# Patient Record
Sex: FEMALE | Race: WHITE | HISPANIC OR LATINO | URBAN - METROPOLITAN AREA
[De-identification: names, ages, dates, MRNs, and addresses within clinical notes are randomized per-mention and may not be internally consistent; named-entity substitution may affect disease eponyms.]

---

## 2021-10-12 ENCOUNTER — EMERGENCY (EMERGENCY)
Facility: HOSPITAL | Age: 56
LOS: 1 days | Discharge: HOME | End: 2021-10-12
Attending: EMERGENCY MEDICINE
Payer: MEDICAID

## 2021-10-12 VITALS
RESPIRATION RATE: 18 BRPM | OXYGEN SATURATION: 96 % | TEMPERATURE: 100 F | DIASTOLIC BLOOD PRESSURE: 69 MMHG | HEART RATE: 73 BPM | WEIGHT: 173.5 LBS | SYSTOLIC BLOOD PRESSURE: 130 MMHG | HEIGHT: 62 IN

## 2021-10-12 DIAGNOSIS — Z90.710 ACQUIRED ABSENCE OF BOTH CERVIX AND UTERUS: Chronic | ICD-10-CM

## 2021-10-12 LAB
APPEARANCE UR: CLEAR — SIGNIFICANT CHANGE UP
BACTERIA # UR AUTO: ABNORMAL
BILIRUB UR-MCNC: NEGATIVE — SIGNIFICANT CHANGE UP
COLOR SPEC: YELLOW — SIGNIFICANT CHANGE UP
DIFF PNL FLD: ABNORMAL
EPI CELLS # UR: SIGNIFICANT CHANGE UP
GLUCOSE UR QL: NEGATIVE MG/DL — SIGNIFICANT CHANGE UP
HYALINE CASTS # UR AUTO: ABNORMAL /LPF
KETONES UR-MCNC: NEGATIVE — SIGNIFICANT CHANGE UP
LEUKOCYTE ESTERASE UR-ACNC: ABNORMAL
NITRITE UR-MCNC: NEGATIVE — SIGNIFICANT CHANGE UP
PH UR: 6 — SIGNIFICANT CHANGE UP (ref 5–8)
PROT UR-MCNC: 30 MG/DL
RBC CASTS # UR COMP ASSIST: ABNORMAL /HPF (ref 0–4)
SP GR SPEC: 1.02 — SIGNIFICANT CHANGE UP (ref 1.01–1.02)
UROBILINOGEN FLD QL: NEGATIVE MG/DL — SIGNIFICANT CHANGE UP
WBC UR QL: ABNORMAL

## 2021-10-12 PROCEDURE — 99283 EMERGENCY DEPT VISIT LOW MDM: CPT

## 2021-10-12 RX ORDER — CEPHALEXIN 500 MG
1 CAPSULE ORAL
Qty: 14 | Refills: 0
Start: 2021-10-12 | End: 2021-10-18

## 2021-10-12 NOTE — ED PROVIDER NOTE - PATIENT PORTAL LINK FT
You can access the FollowMyHealth Patient Portal offered by [facility] by registering at the following website: [email or web address]. By joining [facility]’s FollowMyHealth portal, you will also be able to view your health information using other applications (apps) compatible with our system.

## 2021-10-12 NOTE — ED PROVIDER NOTE - PROGRESS NOTE DETAILS
patient seen by attending.  patient c/o urinary frequency and incontinence for past several days.  patient also c/o vaginal lesion to canal for past month, no pain. patient has appt with her GYN this week.    exam (performed with family, , and student in room)  reveals small nontender lesion to canal, possible prolapsed uterus??  will run UA r/o UTI, no cva tenderness, no fevers, no chills.   patient aware needs to see her GYN as planned due to lesion and work up patient seen by attending.  patient c/o urinary frequency and incontinence for past several days.  patient also c/o vaginal lesion to canal for past month, no pain. patient has appt with her GYN this week.    exam (performed with family, , and student in room)  reveals small nontender lesion to canal,  will run UA r/o UTI, no cva tenderness, no fevers, no chills.   patient aware needs to see her GYN as planned due to lesion and work up ABDULLAHI King: patient seen by attending.  patient c/o urinary frequency and incontinence for past several days.  patient also c/o vaginal lesion to canal for past month, no pain. patient has appt with her GYN this week.    exam (performed with family, , and student in room)  reveals small nontender lesion to canal,  will run UA r/o UTI, no cva tenderness, no fevers, no chills.   patient aware needs to see her GYN as planned due to lesion and work up

## 2021-10-12 NOTE — ED ADULT TRIAGE NOTE - CHIEF COMPLAINT QUOTE
Pt c/o vulva growth X 1 month and now having difficulty urinating.  Pt also c/o lower back pain X 2 weeks.

## 2021-10-12 NOTE — ED PROVIDER NOTE - CHIEF COMPLAINT
The patient is a 56y Female complaining of urinary retention. The patient is a 56y Female complaining of urinary symptoms.

## 2021-10-19 ENCOUNTER — APPOINTMENT (OUTPATIENT)
Dept: OBGYN | Facility: CLINIC | Age: 56
End: 2021-10-19
Payer: MEDICAID

## 2021-10-19 VITALS
BODY MASS INDEX: 30.73 KG/M2 | DIASTOLIC BLOOD PRESSURE: 78 MMHG | HEIGHT: 63 IN | SYSTOLIC BLOOD PRESSURE: 124 MMHG | WEIGHT: 173.44 LBS

## 2021-10-19 DIAGNOSIS — Z78.9 OTHER SPECIFIED HEALTH STATUS: ICD-10-CM

## 2021-10-19 DIAGNOSIS — N81.6 RECTOCELE: ICD-10-CM

## 2021-10-19 DIAGNOSIS — Z86.59 PERSONAL HISTORY OF OTHER MENTAL AND BEHAVIORAL DISORDERS: ICD-10-CM

## 2021-10-19 DIAGNOSIS — N89.8 OTHER SPECIFIED NONINFLAMMATORY DISORDERS OF VAGINA: ICD-10-CM

## 2021-10-19 DIAGNOSIS — Z00.00 ENCOUNTER FOR GENERAL ADULT MEDICAL EXAMINATION W/OUT ABNORMAL FINDINGS: ICD-10-CM

## 2021-10-19 DIAGNOSIS — Z85.42 PERSONAL HISTORY OF MALIGNANT NEOPLASM OF OTHER PARTS OF UTERUS: ICD-10-CM

## 2021-10-19 DIAGNOSIS — Z82.49 FAMILY HISTORY OF ISCHEMIC HEART DISEASE AND OTHER DISEASES OF THE CIRCULATORY SYSTEM: ICD-10-CM

## 2021-10-19 DIAGNOSIS — Z78.0 ASYMPTOMATIC MENOPAUSAL STATE: ICD-10-CM

## 2021-10-19 DIAGNOSIS — Z87.440 PERSONAL HISTORY OF URINARY (TRACT) INFECTIONS: ICD-10-CM

## 2021-10-19 DIAGNOSIS — Z80.0 FAMILY HISTORY OF MALIGNANT NEOPLASM OF DIGESTIVE ORGANS: ICD-10-CM

## 2021-10-19 DIAGNOSIS — Z01.419 ENCOUNTER FOR GYNECOLOGICAL EXAMINATION (GENERAL) (ROUTINE) W/OUT ABNORMAL FINDINGS: ICD-10-CM

## 2021-10-19 DIAGNOSIS — Z83.3 FAMILY HISTORY OF DIABETES MELLITUS: ICD-10-CM

## 2021-10-19 PROCEDURE — 99386 PREV VISIT NEW AGE 40-64: CPT

## 2021-10-19 PROCEDURE — 99202 OFFICE O/P NEW SF 15 MIN: CPT | Mod: 25

## 2021-10-19 NOTE — HISTORY OF PRESENT ILLNESS
[N] : Patient is not sexually active [Y] : Positive pregnancy history [Menarche Age: ____] : age at menarche was [unfilled] [Menopause Age: ____] : age at menopause was [unfilled] [Mammogramdate] : 01/18 [PapSmeardate] : 01/19 [ColonoscopyDate] : 01/17 [PGHxTotal] : 4 [[facility] [PGHxPremature] : 0 [PGHxAbortions] : 0 [[facility]] : 4 [PGHxABInduced] : 0 [PGHxABSpont] : 0 [PGHxEctopic] : 0 [PGHxMultBirths] : 0

## 2021-10-20 ENCOUNTER — APPOINTMENT (OUTPATIENT)
Dept: MAMMOGRAPHY | Facility: CLINIC | Age: 56
End: 2021-10-20

## 2021-10-20 ENCOUNTER — RESULT REVIEW (OUTPATIENT)
Age: 56
End: 2021-10-20

## 2021-10-20 ENCOUNTER — OUTPATIENT (OUTPATIENT)
Dept: OUTPATIENT SERVICES | Facility: HOSPITAL | Age: 56
LOS: 1 days | End: 2021-10-20
Payer: MEDICAID

## 2021-10-20 ENCOUNTER — APPOINTMENT (OUTPATIENT)
Dept: RADIOLOGY | Facility: CLINIC | Age: 56
End: 2021-10-20

## 2021-10-20 DIAGNOSIS — Z90.710 ACQUIRED ABSENCE OF BOTH CERVIX AND UTERUS: Chronic | ICD-10-CM

## 2021-10-20 DIAGNOSIS — Z00.00 ENCOUNTER FOR GENERAL ADULT MEDICAL EXAMINATION WITHOUT ABNORMAL FINDINGS: ICD-10-CM

## 2021-10-20 LAB — HPV HIGH+LOW RISK DNA PNL CVX: NOT DETECTED

## 2021-10-20 PROCEDURE — 77085 DXA BONE DENSITY AXL VRT FX: CPT

## 2021-10-20 PROCEDURE — 77067 SCR MAMMO BI INCL CAD: CPT

## 2021-10-20 PROCEDURE — 77063 BREAST TOMOSYNTHESIS BI: CPT

## 2021-10-25 LAB — CYTOLOGY CVX/VAG DOC THIN PREP: NORMAL

## 2021-10-26 ENCOUNTER — APPOINTMENT (OUTPATIENT)
Dept: OBGYN | Facility: CLINIC | Age: 56
End: 2021-10-26

## 2022-11-21 ENCOUNTER — OFFICE (OUTPATIENT)
Dept: URBAN - METROPOLITAN AREA CLINIC 115 | Facility: CLINIC | Age: 57
Setting detail: OPHTHALMOLOGY
End: 2022-11-21
Payer: MEDICAID

## 2022-11-21 DIAGNOSIS — H40.013: ICD-10-CM

## 2022-11-21 DIAGNOSIS — H04.123: ICD-10-CM

## 2022-11-21 DIAGNOSIS — H25.13: ICD-10-CM

## 2022-11-21 PROCEDURE — 92012 INTRM OPH EXAM EST PATIENT: CPT | Performed by: OPHTHALMOLOGY

## 2022-11-21 PROCEDURE — 92083 EXTENDED VISUAL FIELD XM: CPT | Performed by: OPHTHALMOLOGY

## 2022-11-21 ASSESSMENT — REFRACTION_CURRENTRX
OS_ADD: +2.00
OS_SPHERE: +2.00
OD_SPHERE: +1.75
OS_ADD: +2.00
OD_OVR_VA: 20/
OS_OVR_VA: 20/
OD_VPRISM_DIRECTION: PROGS
OD_CYLINDER: -0.50
OD_ADD: +2.00
OS_CYLINDER: SPH
OS_VPRISM_DIRECTION: PROGS
OS_SPHERE: +2.25
OD_CYLINDER: -0.50
OD_AXIS: 090
OD_SPHERE: +1.75
OS_CYLINDER: 0.00
OS_AXIS: 180
OD_VPRISM_DIRECTION: PROGS
OD_OVR_VA: 20/
OS_VPRISM_DIRECTION: PROGS
OS_OVR_VA: 20/
OD_AXIS: 086
OD_ADD: +2.00

## 2022-11-21 ASSESSMENT — PACHYMETRY
OD_CT_CORRECTION: 1
OS_CT_CORRECTION: 1
OS_CT_UM: 534
OD_CT_UM: 522

## 2022-11-21 ASSESSMENT — REFRACTION_MANIFEST
OD_VA1: 20/20
OD_AXIS: 070
OS_VA2: 20/20
OS_VA1: 20/20
OS_SPHERE: +1.00
OD_VA2: 20/20
OD_CYLINDER: -0.50
OS_ADD: +2.25
OS_CYLINDER: -0.25
OD_ADD: +2.25
OD_SPHERE: +1.00
OS_AXIS: 110

## 2022-11-21 ASSESSMENT — AXIALLENGTH_DERIVED
OD_AL: 22.5658
OS_AL: 22.5214
OS_AL: 22.1289
OD_AL: 22.1718

## 2022-11-21 ASSESSMENT — KERATOMETRY
OS_K1POWER_DIOPTERS: 45.25
OS_AXISANGLE_DEGREES: 073
METHOD_AUTO_MANUAL: AUTO
OS_K2POWER_DIOPTERS: 46.00
OD_K2POWER_DIOPTERS: 46.00
OD_K1POWER_DIOPTERS: 45.25
OD_AXISANGLE_DEGREES: 096

## 2022-11-21 ASSESSMENT — SPHEQUIV_DERIVED
OS_SPHEQUIV: 2
OD_SPHEQUIV: 0.75
OS_SPHEQUIV: 0.875
OD_SPHEQUIV: 1.875

## 2022-11-21 ASSESSMENT — REFRACTION_AUTOREFRACTION
OS_SPHERE: +2.25
OS_AXIS: 091
OD_CYLINDER: -0.75
OD_AXIS: 074
OS_CYLINDER: -0.50
OD_SPHERE: +2.25

## 2022-11-21 ASSESSMENT — VISUAL ACUITY
OD_BCVA: 20/25
OS_BCVA: 20/25

## 2022-11-21 ASSESSMENT — DECREASING TEAR LAKE - SEVERITY SCORE
OS_DEC_TEARLAKE: 1+
OD_DEC_TEARLAKE: 1+

## 2022-11-21 ASSESSMENT — TONOMETRY
OD_IOP_MMHG: 19
OS_IOP_MMHG: 18

## 2022-12-14 ENCOUNTER — OFFICE (OUTPATIENT)
Dept: URBAN - METROPOLITAN AREA CLINIC 115 | Facility: CLINIC | Age: 57
Setting detail: OPHTHALMOLOGY
End: 2022-12-14
Payer: MEDICAID

## 2022-12-14 DIAGNOSIS — H25.11: ICD-10-CM

## 2022-12-14 DIAGNOSIS — H40.013: ICD-10-CM

## 2022-12-14 DIAGNOSIS — H25.13: ICD-10-CM

## 2022-12-14 DIAGNOSIS — H25.12: ICD-10-CM

## 2022-12-14 PROCEDURE — 92250 FUNDUS PHOTOGRAPHY W/I&R: CPT | Performed by: OPHTHALMOLOGY

## 2022-12-14 PROCEDURE — 99214 OFFICE O/P EST MOD 30 MIN: CPT | Performed by: OPHTHALMOLOGY

## 2022-12-14 PROCEDURE — 92136 OPHTHALMIC BIOMETRY: CPT | Performed by: OPHTHALMOLOGY

## 2022-12-14 PROCEDURE — 92083 EXTENDED VISUAL FIELD XM: CPT | Performed by: OPHTHALMOLOGY

## 2022-12-14 PROCEDURE — 92020 GONIOSCOPY: CPT | Performed by: OPHTHALMOLOGY

## 2022-12-14 ASSESSMENT — KERATOMETRY
OS_K1POWER_DIOPTERS: 45.25
OS_K2POWER_DIOPTERS: 46.00
METHOD_AUTO_MANUAL: AUTO
OD_K1POWER_DIOPTERS: 45.25
OD_AXISANGLE_DEGREES: 096
OD_K2POWER_DIOPTERS: 46.00
OS_AXISANGLE_DEGREES: 073

## 2022-12-14 ASSESSMENT — REFRACTION_AUTOREFRACTION
OD_AXIS: 069
OS_SPHERE: +2.25
OS_CYLINDER: -0.25
OD_CYLINDER: -0.50
OD_SPHERE: +2.25
OS_AXIS: 079

## 2022-12-14 ASSESSMENT — REFRACTION_MANIFEST
OS_CYLINDER: -0.25
OS_ADD: +2.25
OD_VA1: 20/40
OD_AXIS: 070
OD_SPHERE: +1.00
OD_VA2: 20/20
OD_CYLINDER: -0.50
OD_ADD: +2.25
OS_VA1: 20/30
OS_VA2: 20/20
OS_SPHERE: +1.00
OS_AXIS: 110

## 2022-12-14 ASSESSMENT — REFRACTION_CURRENTRX
OD_OVR_VA: 20/
OS_OVR_VA: 20/
OD_VPRISM_DIRECTION: PROGS
OD_SPHERE: +1.75
OS_VPRISM_DIRECTION: PROGS
OS_CYLINDER: SPH
OD_ADD: +2.00
OS_ADD: +2.00
OD_VPRISM_DIRECTION: PROGS
OS_VPRISM_DIRECTION: PROGS
OS_ADD: +2.00
OD_CYLINDER: -0.50
OD_AXIS: 086
OD_SPHERE: +1.75
OS_CYLINDER: 0.00
OS_SPHERE: +2.25
OD_OVR_VA: 20/
OS_OVR_VA: 20/
OD_AXIS: 090
OS_AXIS: 180
OD_CYLINDER: -0.50
OD_ADD: +2.00
OS_SPHERE: +2.00

## 2022-12-14 ASSESSMENT — CONFRONTATIONAL VISUAL FIELD TEST (CVF)
OD_FINDINGS: FULL
OS_FINDINGS: FULL

## 2022-12-14 ASSESSMENT — PACHYMETRY
OD_CT_UM: 522
OS_CT_CORRECTION: 1
OS_CT_UM: 534
OD_CT_CORRECTION: 1

## 2022-12-14 ASSESSMENT — TONOMETRY
OS_IOP_MMHG: 16
OD_IOP_MMHG: 16

## 2022-12-14 ASSESSMENT — SPHEQUIV_DERIVED
OS_SPHEQUIV: 2.125
OS_SPHEQUIV: 0.875
OD_SPHEQUIV: 0.75
OD_SPHEQUIV: 2

## 2022-12-14 ASSESSMENT — VISUAL ACUITY
OS_BCVA: 20/40
OD_BCVA: 20/30

## 2022-12-14 ASSESSMENT — AXIALLENGTH_DERIVED
OD_AL: 22.1289
OD_AL: 22.5658
OS_AL: 22.5214
OS_AL: 22.0861

## 2022-12-14 ASSESSMENT — DECREASING TEAR LAKE - SEVERITY SCORE
OS_DEC_TEARLAKE: 1+
OD_DEC_TEARLAKE: 1+

## 2022-12-14 NOTE — ED PROVIDER NOTE - DATE/TIME 1
----- Message from Alex Freeman MD sent at 12/14/2022  9:09 AM CST -----  This testosterone level is low. We may need to adjust medication.  Communicated via patient portal.  Please contact me with any questions.    (PSR: Please alert patient there is a portal result.)  
[Adequate] : adequate
12-Oct-2021 08:51

## 2023-01-13 ENCOUNTER — OFFICE (OUTPATIENT)
Dept: URBAN - METROPOLITAN AREA CLINIC 94 | Facility: CLINIC | Age: 58
Setting detail: OPHTHALMOLOGY
End: 2023-01-13
Payer: MEDICAID

## 2023-01-13 DIAGNOSIS — H40.013: ICD-10-CM

## 2023-01-13 DIAGNOSIS — H04.123: ICD-10-CM

## 2023-01-13 DIAGNOSIS — H25.13: ICD-10-CM

## 2023-01-13 PROCEDURE — 92012 INTRM OPH EXAM EST PATIENT: CPT | Performed by: OPHTHALMOLOGY

## 2023-01-13 ASSESSMENT — AXIALLENGTH_DERIVED
OS_AL: 22.5214
OS_AL: 22.0861
OD_AL: 22.4821
OD_AL: 22.0059

## 2023-01-13 ASSESSMENT — REFRACTION_CURRENTRX
OS_ADD: +2.00
OD_ADD: +2.00
OD_OVR_VA: 20/
OD_AXIS: 090
OD_OVR_VA: 20/
OS_SPHERE: +2.25
OD_AXIS: 086
OS_SPHERE: +2.00
OS_CYLINDER: SPH
OD_SPHERE: +1.75
OS_OVR_VA: 20/
OS_AXIS: 180
OD_ADD: +2.00
OD_VPRISM_DIRECTION: PROGS
OD_CYLINDER: -0.50
OD_CYLINDER: -0.50
OS_VPRISM_DIRECTION: PROGS
OS_CYLINDER: 0.00
OD_SPHERE: +1.75
OS_VPRISM_DIRECTION: PROGS
OS_OVR_VA: 20/
OD_VPRISM_DIRECTION: PROGS
OS_ADD: +2.00

## 2023-01-13 ASSESSMENT — KERATOMETRY
OD_K1POWER_DIOPTERS: 45.50
OS_K2POWER_DIOPTERS: 46.00
OS_AXISANGLE_DEGREES: 078
OD_AXISANGLE_DEGREES: 092
METHOD_AUTO_MANUAL: AUTO
OS_K1POWER_DIOPTERS: 45.25
OD_K2POWER_DIOPTERS: 46.25

## 2023-01-13 ASSESSMENT — SPHEQUIV_DERIVED
OS_SPHEQUIV: 0.875
OD_SPHEQUIV: 0.75
OS_SPHEQUIV: 2.125
OD_SPHEQUIV: 2.125

## 2023-01-13 ASSESSMENT — CONFRONTATIONAL VISUAL FIELD TEST (CVF)
OS_FINDINGS: FULL
OD_FINDINGS: FULL

## 2023-01-13 ASSESSMENT — VISUAL ACUITY
OD_BCVA: 20/40
OS_BCVA: 20/30

## 2023-01-13 ASSESSMENT — REFRACTION_MANIFEST
OD_CYLINDER: -0.50
OS_VA2: 20/20
OS_ADD: +2.25
OS_VA1: 20/30
OS_AXIS: 110
OS_SPHERE: +1.00
OS_CYLINDER: -0.25
OD_VA1: 20/40
OD_AXIS: 070
OD_VA2: 20/20
OD_SPHERE: +1.00
OD_ADD: +2.25

## 2023-01-13 ASSESSMENT — PACHYMETRY
OS_CT_UM: 534
OD_CT_CORRECTION: 1
OD_CT_UM: 522
OS_CT_CORRECTION: 1

## 2023-01-13 ASSESSMENT — REFRACTION_AUTOREFRACTION
OD_SPHERE: +2.50
OD_AXIS: 064
OS_AXIS: 073
OS_SPHERE: +2.25
OS_CYLINDER: -0.25
OD_CYLINDER: -0.75

## 2023-01-13 ASSESSMENT — TONOMETRY
OS_IOP_MMHG: 14
OD_IOP_MMHG: 14

## 2023-01-13 ASSESSMENT — DECREASING TEAR LAKE - SEVERITY SCORE
OD_DEC_TEARLAKE: 1+
OS_DEC_TEARLAKE: 1+

## 2023-01-16 ENCOUNTER — OFFICE (OUTPATIENT)
Dept: URBAN - METROPOLITAN AREA CLINIC 94 | Facility: CLINIC | Age: 58
Setting detail: OPHTHALMOLOGY
End: 2023-01-16
Payer: MEDICAID

## 2023-01-16 DIAGNOSIS — Z01.812: ICD-10-CM

## 2023-01-16 DIAGNOSIS — Z20.822: ICD-10-CM

## 2023-01-16 PROCEDURE — 99211 OFF/OP EST MAY X REQ PHY/QHP: CPT | Performed by: OPHTHALMOLOGY

## 2023-01-16 ASSESSMENT — SPHEQUIV_DERIVED
OD_SPHEQUIV: 0.75
OS_SPHEQUIV: 0.875
OS_SPHEQUIV: 2.125
OD_SPHEQUIV: 2.125

## 2023-01-16 ASSESSMENT — REFRACTION_CURRENTRX
OS_ADD: +2.00
OS_VPRISM_DIRECTION: PROGS
OD_SPHERE: +1.75
OS_ADD: +2.00
OD_AXIS: 090
OS_AXIS: 180
OD_CYLINDER: -0.50
OD_AXIS: 086
OS_CYLINDER: 0.00
OD_CYLINDER: -0.50
OS_OVR_VA: 20/
OD_VPRISM_DIRECTION: PROGS
OS_SPHERE: +2.25
OD_ADD: +2.00
OD_ADD: +2.00
OD_OVR_VA: 20/
OD_OVR_VA: 20/
OS_OVR_VA: 20/
OS_SPHERE: +2.00
OS_CYLINDER: SPH
OD_SPHERE: +1.75
OD_VPRISM_DIRECTION: PROGS
OS_VPRISM_DIRECTION: PROGS

## 2023-01-16 ASSESSMENT — REFRACTION_MANIFEST
OD_VA2: 20/20
OD_SPHERE: +1.00
OS_SPHERE: +1.00
OD_AXIS: 070
OD_VA1: 20/40
OS_VA1: 20/30
OD_ADD: +2.25
OS_CYLINDER: -0.25
OD_CYLINDER: -0.50
OS_VA2: 20/20
OS_ADD: +2.25
OS_AXIS: 110

## 2023-01-16 ASSESSMENT — KERATOMETRY
OS_K1POWER_DIOPTERS: 45.25
OS_K2POWER_DIOPTERS: 46.00
OD_AXISANGLE_DEGREES: 092
OD_K2POWER_DIOPTERS: 46.25
OD_K1POWER_DIOPTERS: 45.50
METHOD_AUTO_MANUAL: AUTO
OS_AXISANGLE_DEGREES: 078

## 2023-01-16 ASSESSMENT — AXIALLENGTH_DERIVED
OS_AL: 22.0861
OD_AL: 22.0059
OS_AL: 22.5214
OD_AL: 22.4821

## 2023-01-16 ASSESSMENT — VISUAL ACUITY
OD_BCVA: 20/40
OS_BCVA: 20/30

## 2023-01-16 ASSESSMENT — DECREASING TEAR LAKE - SEVERITY SCORE
OD_DEC_TEARLAKE: 1+
OS_DEC_TEARLAKE: 1+

## 2023-01-16 ASSESSMENT — REFRACTION_AUTOREFRACTION
OS_AXIS: 073
OS_CYLINDER: -0.25
OD_CYLINDER: -0.75
OD_AXIS: 064
OD_SPHERE: +2.50
OS_SPHERE: +2.25

## 2023-01-21 ENCOUNTER — OFFICE (OUTPATIENT)
Dept: URBAN - METROPOLITAN AREA CLINIC 94 | Facility: CLINIC | Age: 58
Setting detail: OPHTHALMOLOGY
End: 2023-01-21
Payer: MEDICAID

## 2023-01-21 DIAGNOSIS — Z01.812: ICD-10-CM

## 2023-01-21 DIAGNOSIS — Z20.822: ICD-10-CM

## 2023-01-21 PROCEDURE — 99211 OFF/OP EST MAY X REQ PHY/QHP: CPT | Performed by: OPHTHALMOLOGY

## 2023-01-23 ASSESSMENT — VISUAL ACUITY
OD_BCVA: 20/40
OS_BCVA: 20/30

## 2023-01-23 ASSESSMENT — REFRACTION_CURRENTRX
OD_ADD: +2.00
OS_ADD: +2.00
OS_ADD: +2.00
OS_SPHERE: +2.25
OD_SPHERE: +1.75
OD_CYLINDER: -0.50
OD_AXIS: 090
OD_CYLINDER: -0.50
OD_SPHERE: +1.75
OS_VPRISM_DIRECTION: PROGS
OS_SPHERE: +2.00
OS_CYLINDER: SPH
OS_OVR_VA: 20/
OS_CYLINDER: 0.00
OD_OVR_VA: 20/
OD_OVR_VA: 20/
OS_VPRISM_DIRECTION: PROGS
OS_AXIS: 180
OD_AXIS: 086
OD_VPRISM_DIRECTION: PROGS
OD_VPRISM_DIRECTION: PROGS
OS_OVR_VA: 20/
OD_ADD: +2.00

## 2023-01-23 ASSESSMENT — REFRACTION_MANIFEST
OD_VA2: 20/20
OS_ADD: +2.25
OD_ADD: +2.25
OS_VA2: 20/20
OD_CYLINDER: -0.50
OS_AXIS: 110
OD_VA1: 20/40
OS_SPHERE: +1.00
OS_VA1: 20/30
OD_SPHERE: +1.00
OS_CYLINDER: -0.25
OD_AXIS: 070

## 2023-01-23 ASSESSMENT — SPHEQUIV_DERIVED
OD_SPHEQUIV: 0.75
OS_SPHEQUIV: 2.125
OS_SPHEQUIV: 0.875
OD_SPHEQUIV: 2.125

## 2023-01-23 ASSESSMENT — REFRACTION_AUTOREFRACTION
OD_SPHERE: +2.50
OD_CYLINDER: -0.75
OS_CYLINDER: -0.25
OS_AXIS: 073
OD_AXIS: 064
OS_SPHERE: +2.25

## 2023-01-23 ASSESSMENT — KERATOMETRY
OS_K2POWER_DIOPTERS: 46.00
OD_AXISANGLE_DEGREES: 092
METHOD_AUTO_MANUAL: AUTO
OD_K2POWER_DIOPTERS: 46.25
OD_K1POWER_DIOPTERS: 45.50
OS_AXISANGLE_DEGREES: 078
OS_K1POWER_DIOPTERS: 45.25

## 2023-01-23 ASSESSMENT — AXIALLENGTH_DERIVED
OS_AL: 22.5214
OD_AL: 22.0059
OD_AL: 22.4821
OS_AL: 22.0861

## 2023-01-23 ASSESSMENT — DECREASING TEAR LAKE - SEVERITY SCORE
OD_DEC_TEARLAKE: 1+
OS_DEC_TEARLAKE: 1+

## 2023-01-24 ENCOUNTER — ASC (OUTPATIENT)
Dept: URBAN - METROPOLITAN AREA SURGERY 8 | Facility: SURGERY | Age: 58
Setting detail: OPHTHALMOLOGY
End: 2023-01-24
Payer: MEDICAID

## 2023-01-24 DIAGNOSIS — H52.211: ICD-10-CM

## 2023-01-24 DIAGNOSIS — H25.11: ICD-10-CM

## 2023-01-24 PROCEDURE — 66984 XCAPSL CTRC RMVL W/O ECP: CPT | Performed by: OPHTHALMOLOGY

## 2023-01-24 PROCEDURE — FEMTO CATARACT LASER: Performed by: OPHTHALMOLOGY

## 2023-01-25 ENCOUNTER — RX ONLY (RX ONLY)
Age: 58
End: 2023-01-25

## 2023-01-25 ENCOUNTER — OFFICE (OUTPATIENT)
Dept: URBAN - METROPOLITAN AREA CLINIC 94 | Facility: CLINIC | Age: 58
Setting detail: OPHTHALMOLOGY
End: 2023-01-25

## 2023-01-25 DIAGNOSIS — Z96.1: ICD-10-CM

## 2023-01-25 PROCEDURE — 99024 POSTOP FOLLOW-UP VISIT: CPT | Performed by: OPHTHALMOLOGY

## 2023-01-25 ASSESSMENT — REFRACTION_CURRENTRX
OS_CYLINDER: 0.00
OD_VPRISM_DIRECTION: PROGS
OD_SPHERE: +1.75
OS_VPRISM_DIRECTION: PROGS
OS_AXIS: 180
OD_OVR_VA: 20/
OD_ADD: +2.00
OD_OVR_VA: 20/
OD_CYLINDER: -0.50
OS_OVR_VA: 20/
OS_SPHERE: +2.00
OD_AXIS: 086
OS_VPRISM_DIRECTION: PROGS
OS_ADD: +2.00
OS_ADD: +2.00
OD_CYLINDER: -0.50
OS_CYLINDER: SPH
OS_SPHERE: +2.25
OD_SPHERE: +1.75
OD_VPRISM_DIRECTION: PROGS
OS_OVR_VA: 20/
OD_ADD: +2.00
OD_AXIS: 090

## 2023-01-25 ASSESSMENT — REFRACTION_AUTOREFRACTION
OD_AXIS: 019
OD_CYLINDER: -0.75
OS_CYLINDER: -0.25
OD_SPHERE: +0.75
OS_SPHERE: +2.25
OS_AXIS: 077

## 2023-01-25 ASSESSMENT — DECREASING TEAR LAKE - SEVERITY SCORE
OD_DEC_TEARLAKE: 1+
OS_DEC_TEARLAKE: 1+

## 2023-01-25 ASSESSMENT — KERATOMETRY
OS_K2POWER_DIOPTERS: 46.00
METHOD_AUTO_MANUAL: AUTO
OD_K2POWER_DIOPTERS: 46.50
OD_AXISANGLE_DEGREES: 100
OD_K1POWER_DIOPTERS: 45.25
OS_K1POWER_DIOPTERS: 45.25
OS_AXISANGLE_DEGREES: 082

## 2023-01-25 ASSESSMENT — SPHEQUIV_DERIVED
OS_SPHEQUIV: 2.125
OD_SPHEQUIV: 0.75
OS_SPHEQUIV: 0.875
OD_SPHEQUIV: 0.375

## 2023-01-25 ASSESSMENT — AXIALLENGTH_DERIVED
OD_AL: 22.6156
OS_AL: 22.0861
OD_AL: 22.4821
OS_AL: 22.5214

## 2023-01-25 ASSESSMENT — REFRACTION_MANIFEST
OS_VA2: 20/20
OD_VA1: 20/40
OD_CYLINDER: -0.50
OD_AXIS: 070
OS_SPHERE: +1.00
OS_CYLINDER: -0.25
OD_VA2: 20/20
OD_SPHERE: +1.00
OD_ADD: +2.25
OS_AXIS: 110
OS_VA1: 20/30
OS_ADD: +2.25

## 2023-01-25 ASSESSMENT — VISUAL ACUITY
OS_BCVA: 20/60
OD_BCVA: 20/40

## 2023-01-25 ASSESSMENT — PACHYMETRY
OD_CT_CORRECTION: 1
OS_CT_CORRECTION: 1
OD_CT_UM: 522
OS_CT_UM: 534

## 2023-01-25 ASSESSMENT — TONOMETRY
OD_IOP_MMHG: 15
OS_IOP_MMHG: 16

## 2023-01-25 ASSESSMENT — CONFRONTATIONAL VISUAL FIELD TEST (CVF)
OD_FINDINGS: FULL
OS_FINDINGS: FULL

## 2023-02-01 ENCOUNTER — OFFICE (OUTPATIENT)
Dept: URBAN - METROPOLITAN AREA CLINIC 94 | Facility: CLINIC | Age: 58
Setting detail: OPHTHALMOLOGY
End: 2023-02-01
Payer: MEDICAID

## 2023-02-01 DIAGNOSIS — H25.12: ICD-10-CM

## 2023-02-01 DIAGNOSIS — Z96.1: ICD-10-CM

## 2023-02-01 PROCEDURE — 92136 OPHTHALMIC BIOMETRY: CPT | Performed by: PHYSICIAN ASSISTANT

## 2023-02-01 PROCEDURE — 99024 POSTOP FOLLOW-UP VISIT: CPT | Performed by: PHYSICIAN ASSISTANT

## 2023-02-01 ASSESSMENT — REFRACTION_CURRENTRX
OD_CYLINDER: -0.50
OS_ADD: +2.00
OS_SPHERE: +2.00
OS_VPRISM_DIRECTION: PROGS
OD_ADD: +2.00
OD_SPHERE: +1.75
OD_CYLINDER: -0.50
OS_AXIS: 180
OS_SPHERE: +2.25
OS_OVR_VA: 20/
OS_VPRISM_DIRECTION: PROGS
OS_OVR_VA: 20/
OD_AXIS: 086
OD_ADD: +2.00
OD_VPRISM_DIRECTION: PROGS
OD_OVR_VA: 20/
OD_OVR_VA: 20/
OD_SPHERE: +1.75
OD_VPRISM_DIRECTION: PROGS
OS_CYLINDER: SPH
OS_ADD: +2.00
OS_CYLINDER: 0.00
OD_AXIS: 090

## 2023-02-01 ASSESSMENT — PACHYMETRY
OS_CT_UM: 534
OD_CT_UM: 522
OD_CT_CORRECTION: 1
OS_CT_CORRECTION: 1

## 2023-02-01 ASSESSMENT — REFRACTION_MANIFEST
OD_VA2: 20/20
OS_VA2: 20/20
OD_CYLINDER: -0.50
OD_AXIS: 070
OD_VA1: 20/40
OD_ADD: +2.25
OS_AXIS: 110
OS_ADD: +2.25
OS_SPHERE: +1.00
OS_VA1: 20/30
OD_SPHERE: +1.00
OS_CYLINDER: -0.25

## 2023-02-01 ASSESSMENT — REFRACTION_AUTOREFRACTION
OD_CYLINDER: -0.25
OD_SPHERE: +0.25
OD_AXIS: 070
OS_SPHERE: +2.50
OS_CYLINDER: -0.25
OS_AXIS: 085

## 2023-02-01 ASSESSMENT — VISUAL ACUITY
OD_BCVA: 20/25
OS_BCVA: 20/25

## 2023-02-01 ASSESSMENT — SUPERFICIAL PUNCTATE KERATITIS (SPK)
OD_SPK: 1+
OS_SPK: 1+

## 2023-02-01 ASSESSMENT — TONOMETRY
OD_IOP_MMHG: 17
OS_IOP_MMHG: 15

## 2023-02-01 ASSESSMENT — KERATOMETRY
METHOD_AUTO_MANUAL: AUTO
OD_K1POWER_DIOPTERS: 45.75
OD_K2POWER_DIOPTERS: 46.00
OS_K2POWER_DIOPTERS: 46.00
OS_K1POWER_DIOPTERS: 45.25
OD_AXISANGLE_DEGREES: 107
OS_AXISANGLE_DEGREES: 081

## 2023-02-01 ASSESSMENT — SPHEQUIV_DERIVED
OS_SPHEQUIV: 2.375
OD_SPHEQUIV: 0.125
OD_SPHEQUIV: 0.75
OS_SPHEQUIV: 0.875

## 2023-02-01 ASSESSMENT — AXIALLENGTH_DERIVED
OD_AL: 22.7055
OD_AL: 22.4821
OS_AL: 22.5214
OS_AL: 22.0011

## 2023-02-01 ASSESSMENT — DECREASING TEAR LAKE - SEVERITY SCORE
OD_DEC_TEARLAKE: 1+
OS_DEC_TEARLAKE: 1+

## 2023-02-01 ASSESSMENT — CONFRONTATIONAL VISUAL FIELD TEST (CVF)
OD_FINDINGS: FULL
OS_FINDINGS: FULL

## 2023-02-06 ENCOUNTER — OFFICE (OUTPATIENT)
Dept: URBAN - METROPOLITAN AREA CLINIC 94 | Facility: CLINIC | Age: 58
Setting detail: OPHTHALMOLOGY
End: 2023-02-06
Payer: MEDICAID

## 2023-02-06 DIAGNOSIS — Z20.822: ICD-10-CM

## 2023-02-06 DIAGNOSIS — Z01.812: ICD-10-CM

## 2023-02-06 PROCEDURE — 99211 OFF/OP EST MAY X REQ PHY/QHP: CPT | Performed by: OPHTHALMOLOGY

## 2023-02-07 ASSESSMENT — KERATOMETRY
OD_K2POWER_DIOPTERS: 46.00
METHOD_AUTO_MANUAL: AUTO
OD_AXISANGLE_DEGREES: 107
OS_K1POWER_DIOPTERS: 45.25
OS_AXISANGLE_DEGREES: 081
OD_K1POWER_DIOPTERS: 45.75
OS_K2POWER_DIOPTERS: 46.00

## 2023-02-07 ASSESSMENT — REFRACTION_CURRENTRX
OS_SPHERE: +2.25
OD_CYLINDER: -0.50
OD_VPRISM_DIRECTION: PROGS
OS_VPRISM_DIRECTION: PROGS
OS_ADD: +2.00
OS_OVR_VA: 20/
OS_SPHERE: +2.00
OD_AXIS: 086
OS_CYLINDER: 0.00
OS_CYLINDER: SPH
OS_ADD: +2.00
OS_AXIS: 180
OD_CYLINDER: -0.50
OD_SPHERE: +1.75
OD_SPHERE: +1.75
OS_OVR_VA: 20/
OD_AXIS: 090
OS_VPRISM_DIRECTION: PROGS
OD_OVR_VA: 20/
OD_ADD: +2.00
OD_VPRISM_DIRECTION: PROGS
OD_ADD: +2.00
OD_OVR_VA: 20/

## 2023-02-07 ASSESSMENT — SPHEQUIV_DERIVED
OD_SPHEQUIV: 0.75
OD_SPHEQUIV: 0.125
OS_SPHEQUIV: 2.375
OS_SPHEQUIV: 0.875

## 2023-02-07 ASSESSMENT — AXIALLENGTH_DERIVED
OD_AL: 22.7055
OS_AL: 22.0011
OS_AL: 22.5214
OD_AL: 22.4821

## 2023-02-07 ASSESSMENT — DECREASING TEAR LAKE - SEVERITY SCORE
OS_DEC_TEARLAKE: 1+
OD_DEC_TEARLAKE: 1+

## 2023-02-07 ASSESSMENT — REFRACTION_MANIFEST
OS_CYLINDER: -0.25
OD_CYLINDER: -0.50
OS_AXIS: 110
OS_SPHERE: +1.00
OD_SPHERE: +1.00
OD_VA2: 20/20
OD_VA1: 20/40
OS_VA1: 20/30
OS_ADD: +2.25
OD_ADD: +2.25
OD_AXIS: 070
OS_VA2: 20/20

## 2023-02-07 ASSESSMENT — SUPERFICIAL PUNCTATE KERATITIS (SPK)
OD_SPK: 1+
OS_SPK: 1+

## 2023-02-07 ASSESSMENT — VISUAL ACUITY
OS_BCVA: 20/25
OD_BCVA: 20/25

## 2023-02-07 ASSESSMENT — REFRACTION_AUTOREFRACTION
OS_CYLINDER: -0.25
OD_AXIS: 070
OS_AXIS: 085
OD_SPHERE: +0.25
OS_SPHERE: +2.50
OD_CYLINDER: -0.25

## 2023-02-08 ENCOUNTER — ASC (OUTPATIENT)
Dept: URBAN - METROPOLITAN AREA SURGERY 8 | Facility: SURGERY | Age: 58
Setting detail: OPHTHALMOLOGY
End: 2023-02-08
Payer: MEDICAID

## 2023-02-08 DIAGNOSIS — H25.12: ICD-10-CM

## 2023-02-08 DIAGNOSIS — H52.212: ICD-10-CM

## 2023-02-08 PROCEDURE — FEMTO CATARACT LASER: Performed by: OPHTHALMOLOGY

## 2023-02-08 PROCEDURE — 66984 XCAPSL CTRC RMVL W/O ECP: CPT | Performed by: OPHTHALMOLOGY

## 2023-02-09 ENCOUNTER — RX ONLY (RX ONLY)
Age: 58
End: 2023-02-09

## 2023-02-09 ENCOUNTER — OFFICE (OUTPATIENT)
Dept: URBAN - METROPOLITAN AREA CLINIC 94 | Facility: CLINIC | Age: 58
Setting detail: OPHTHALMOLOGY
End: 2023-02-09
Payer: MEDICAID

## 2023-02-09 DIAGNOSIS — Z96.1: ICD-10-CM

## 2023-02-09 PROBLEM — H25.12 CATARACT SENILE NUCLEAR SCLEROSIS; LEFT EYE: Status: ACTIVE | Noted: 2023-01-25

## 2023-02-09 PROCEDURE — 99024 POSTOP FOLLOW-UP VISIT: CPT | Performed by: OPTOMETRIST

## 2023-02-09 ASSESSMENT — DECREASING TEAR LAKE - SEVERITY SCORE
OS_DEC_TEARLAKE: 1+
OD_DEC_TEARLAKE: 1+

## 2023-02-09 ASSESSMENT — KERATOMETRY
OD_K2POWER_DIOPTERS: 46.50
METHOD_AUTO_MANUAL: AUTO
OD_K1POWER_DIOPTERS: 45.75
OS_K2POWER_DIOPTERS: 45.75
OS_AXISANGLE_DEGREES: 083
OS_K1POWER_DIOPTERS: 45.00
OD_AXISANGLE_DEGREES: 098

## 2023-02-09 ASSESSMENT — TONOMETRY
OS_IOP_MMHG: 16
OD_IOP_MMHG: 19

## 2023-02-09 ASSESSMENT — REFRACTION_AUTOREFRACTION
OD_SPHERE: +0.25
OS_CYLINDER: -0.25
OD_AXIS: 031
OD_CYLINDER: -0.25
OS_AXIS: 038
OS_SPHERE: +0.25

## 2023-02-09 ASSESSMENT — REFRACTION_MANIFEST
OD_VA1: 20/40
OD_VA2: 20/20
OS_CYLINDER: -0.25
OD_SPHERE: +1.00
OD_AXIS: 070
OS_VA2: 20/20
OD_CYLINDER: -0.50
OS_ADD: +2.25
OS_VA1: 20/30
OD_ADD: +2.25
OS_SPHERE: +1.00
OS_AXIS: 110

## 2023-02-09 ASSESSMENT — PACHYMETRY
OD_CT_CORRECTION: 1
OS_CT_CORRECTION: 1
OS_CT_UM: 534
OD_CT_UM: 522

## 2023-02-09 ASSESSMENT — REFRACTION_CURRENTRX
OS_OVR_VA: 20/
OS_CYLINDER: 0.00
OD_CYLINDER: -0.50
OD_CYLINDER: -0.50
OS_SPHERE: +2.00
OS_VPRISM_DIRECTION: PROGS
OD_VPRISM_DIRECTION: PROGS
OD_AXIS: 090
OD_ADD: +2.00
OD_ADD: +2.00
OD_VPRISM_DIRECTION: PROGS
OS_CYLINDER: SPH
OD_OVR_VA: 20/
OS_ADD: +2.00
OD_SPHERE: +1.75
OS_ADD: +2.00
OS_AXIS: 180
OS_VPRISM_DIRECTION: PROGS
OS_OVR_VA: 20/
OD_AXIS: 086
OD_SPHERE: +1.75
OD_OVR_VA: 20/
OS_SPHERE: +2.25

## 2023-02-09 ASSESSMENT — AXIALLENGTH_DERIVED
OS_AL: 22.6054
OS_AL: 22.8769
OD_AL: 22.399
OD_AL: 22.6207

## 2023-02-09 ASSESSMENT — SUPERFICIAL PUNCTATE KERATITIS (SPK)
OS_SPK: 1+
OD_SPK: 1+

## 2023-02-09 ASSESSMENT — SPHEQUIV_DERIVED
OS_SPHEQUIV: 0.125
OS_SPHEQUIV: 0.875
OD_SPHEQUIV: 0.75
OD_SPHEQUIV: 0.125

## 2023-02-09 ASSESSMENT — VISUAL ACUITY
OS_BCVA: 20/25
OD_BCVA: 20/30-1

## 2023-02-22 ENCOUNTER — OFFICE (OUTPATIENT)
Dept: URBAN - METROPOLITAN AREA CLINIC 116 | Facility: CLINIC | Age: 58
Setting detail: OPHTHALMOLOGY
End: 2023-02-22
Payer: MEDICAID

## 2023-02-22 DIAGNOSIS — Z96.1: ICD-10-CM

## 2023-02-22 PROBLEM — H16.223 DRY EYE SYNDROME K SICCA; BOTH EYES: Status: ACTIVE | Noted: 2023-02-01

## 2023-02-22 PROBLEM — H52.4 PRESBYOPIA: Status: ACTIVE | Noted: 2023-02-22

## 2023-02-22 PROCEDURE — 99024 POSTOP FOLLOW-UP VISIT: CPT | Performed by: OPTOMETRIST

## 2023-02-22 ASSESSMENT — SPHEQUIV_DERIVED
OD_SPHEQUIV: -0.125
OS_SPHEQUIV: -0.625
OD_SPHEQUIV: 0.75
OS_SPHEQUIV: 0.875

## 2023-02-22 ASSESSMENT — REFRACTION_MANIFEST
OS_AXIS: 110
OD_VA1: 20/25
OD_SPHERE: +1.00
OD_ADD: +2.25
OD_AXIS: 070
OS_ADD: +2.25
OS_VA1: 20/30
OS_CYLINDER: -0.25
OD_CYLINDER: -0.50
OD_VA2: 20/20
OS_VA1: 20/25
OD_VA1: 20/40
OD_SPHERE: PLANO
OS_ADD: +2.50
OS_SPHERE: +1.00
OD_ADD: +2.50
OS_SPHERE: -0.50
OS_VA2: 20/20
OS_CYLINDER: SPH

## 2023-02-22 ASSESSMENT — REFRACTION_CURRENTRX
OD_ADD: +2.00
OD_VPRISM_DIRECTION: PROGS
OS_CYLINDER: SPH
OS_ADD: +2.00
OD_AXIS: 090
OD_SPHERE: +1.75
OD_OVR_VA: 20/
OS_SPHERE: +2.25
OD_CYLINDER: -0.50
OS_ADD: +2.00
OS_AXIS: 180
OS_CYLINDER: 0.00
OD_CYLINDER: -0.50
OS_SPHERE: +2.00
OS_VPRISM_DIRECTION: PROGS
OS_OVR_VA: 20/
OD_ADD: +2.00
OD_VPRISM_DIRECTION: PROGS
OD_OVR_VA: 20/
OS_OVR_VA: 20/
OD_SPHERE: +1.75
OS_VPRISM_DIRECTION: PROGS
OD_AXIS: 086

## 2023-02-22 ASSESSMENT — AXIALLENGTH_DERIVED
OS_AL: 22.3965
OS_AL: 22.9359
OD_AL: 22.7532
OD_AL: 22.4405

## 2023-02-22 ASSESSMENT — CONFRONTATIONAL VISUAL FIELD TEST (CVF)
OD_FINDINGS: FULL
OS_FINDINGS: FULL

## 2023-02-22 ASSESSMENT — REFRACTION_AUTOREFRACTION
OS_AXIS: 040
OD_AXIS: 030
OD_SPHERE: 0.00
OS_SPHERE: -0.50
OD_CYLINDER: -0.25
OS_CYLINDER: -0.25

## 2023-02-22 ASSESSMENT — KERATOMETRY
OD_K2POWER_DIOPTERS: 46.25
OS_AXISANGLE_DEGREES: 095
METHOD_AUTO_MANUAL: AUTO
OS_K2POWER_DIOPTERS: 46.25
OS_K1POWER_DIOPTERS: 45.75
OD_K1POWER_DIOPTERS: 45.75
OD_AXISANGLE_DEGREES: 100

## 2023-02-22 ASSESSMENT — VISUAL ACUITY
OS_BCVA: 20/25
OD_BCVA: 20/30-1

## 2023-02-22 ASSESSMENT — SUPERFICIAL PUNCTATE KERATITIS (SPK)
OS_SPK: 1+
OD_SPK: 1+

## 2023-02-22 ASSESSMENT — DECREASING TEAR LAKE - SEVERITY SCORE
OS_DEC_TEARLAKE: 1+
OD_DEC_TEARLAKE: 1+

## 2023-05-24 ENCOUNTER — OFFICE (OUTPATIENT)
Dept: URBAN - METROPOLITAN AREA CLINIC 94 | Facility: CLINIC | Age: 58
Setting detail: OPHTHALMOLOGY
End: 2023-05-24
Payer: MEDICAID

## 2023-05-24 DIAGNOSIS — H40.013: ICD-10-CM

## 2023-05-24 DIAGNOSIS — H16.223: ICD-10-CM

## 2023-05-24 PROCEDURE — 99213 OFFICE O/P EST LOW 20 MIN: CPT | Performed by: OPHTHALMOLOGY

## 2023-05-24 ASSESSMENT — REFRACTION_MANIFEST
OD_ADD: +2.25
OS_VA1: 20/30
OS_ADD: +2.50
OD_AXIS: 070
OD_VA2: 20/20
OS_CYLINDER: -0.25
OS_VA2: 20/20
OD_SPHERE: +1.00
OS_ADD: +2.25
OS_AXIS: 110
OD_SPHERE: +1.25
OS_SPHERE: -0.50
OS_SPHERE: +1.00
OS_VA1: 20/25
OD_CYLINDER: -0.50
OS_CYLINDER: SPH
OD_SPHERE: PLANO
OD_VA1: 20/40
OS_SPHERE: +1.25
OD_ADD: +2.50
OD_VA1: 20/25

## 2023-05-24 ASSESSMENT — REFRACTION_AUTOREFRACTION
OS_AXIS: 000
OS_SPHERE: -0.25
OD_CYLINDER: 0.00
OS_CYLINDER: 0.00
OD_AXIS: 000
OD_SPHERE: +0.25

## 2023-05-24 ASSESSMENT — SPHEQUIV_DERIVED
OS_SPHEQUIV: 0.875
OD_SPHEQUIV: 0.75
OD_SPHEQUIV: 0.25
OS_SPHEQUIV: -0.25

## 2023-05-24 ASSESSMENT — REFRACTION_CURRENTRX
OD_ADD: +2.00
OS_VPRISM_DIRECTION: PROGS
OS_CYLINDER: 0.00
OD_AXIS: 090
OS_AXIS: 180
OD_SPHERE: +1.75
OD_OVR_VA: 20/
OS_SPHERE: +2.00
OD_CYLINDER: -0.50
OD_VPRISM_DIRECTION: PROGS
OD_AXIS: 086
OD_SPHERE: +1.75
OS_ADD: +2.00
OD_ADD: +2.00
OD_CYLINDER: -0.50
OS_OVR_VA: 20/
OS_VPRISM_DIRECTION: PROGS
OS_SPHERE: +2.25
OD_OVR_VA: 20/
OS_OVR_VA: 20/
OS_CYLINDER: SPH
OD_VPRISM_DIRECTION: PROGS
OS_ADD: +2.00

## 2023-05-24 ASSESSMENT — SUPERFICIAL PUNCTATE KERATITIS (SPK)
OS_SPK: 1+
OD_SPK: 1+

## 2023-05-24 ASSESSMENT — PACHYMETRY
OD_CT_UM: 522
OD_CT_CORRECTION: 1
OS_CT_UM: 534
OS_CT_CORRECTION: 1

## 2023-05-24 ASSESSMENT — VISUAL ACUITY
OS_BCVA: 20/25
OD_BCVA: 20/20

## 2023-05-24 ASSESSMENT — AXIALLENGTH_DERIVED
OD_AL: 22.7029
OS_AL: 22.8847
OD_AL: 22.5239
OS_AL: 22.4796

## 2023-05-24 ASSESSMENT — KERATOMETRY
OS_AXISANGLE_DEGREES: 085
OD_K2POWER_DIOPTERS: 46.00
OD_AXISANGLE_DEGREES: 090
OS_K1POWER_DIOPTERS: 45.50
OS_K2POWER_DIOPTERS: 46.00
METHOD_AUTO_MANUAL: AUTO
OD_K1POWER_DIOPTERS: 45.50

## 2023-05-24 ASSESSMENT — DECREASING TEAR LAKE - SEVERITY SCORE
OD_DEC_TEARLAKE: 1+
OS_DEC_TEARLAKE: 1+

## 2023-05-24 ASSESSMENT — CONFRONTATIONAL VISUAL FIELD TEST (CVF)
OS_FINDINGS: FULL
OD_FINDINGS: FULL

## 2023-05-24 ASSESSMENT — TONOMETRY
OS_IOP_MMHG: 16
OD_IOP_MMHG: 16

## 2024-03-29 ENCOUNTER — APPOINTMENT (OUTPATIENT)
Dept: DERMATOLOGY | Facility: CLINIC | Age: 59
End: 2024-03-29
Payer: MEDICAID

## 2024-03-29 PROCEDURE — 99203 OFFICE O/P NEW LOW 30 MIN: CPT

## 2025-04-14 ENCOUNTER — OFFICE (OUTPATIENT)
Dept: URBAN - METROPOLITAN AREA CLINIC 115 | Facility: CLINIC | Age: 60
Setting detail: OPHTHALMOLOGY
End: 2025-04-14
Payer: COMMERCIAL

## 2025-04-14 DIAGNOSIS — H40.013: ICD-10-CM

## 2025-04-14 DIAGNOSIS — H26.493: ICD-10-CM

## 2025-04-14 DIAGNOSIS — Z96.1: ICD-10-CM

## 2025-04-14 DIAGNOSIS — H16.223: ICD-10-CM

## 2025-04-14 PROBLEM — H35.372 EPIRETINAL MEMBRANE; LEFT EYE: Status: ACTIVE | Noted: 2025-04-14

## 2025-04-14 PROCEDURE — 92133 CPTRZD OPH DX IMG PST SGM ON: CPT | Performed by: OPHTHALMOLOGY

## 2025-04-14 PROCEDURE — 92014 COMPRE OPH EXAM EST PT 1/>: CPT | Performed by: OPHTHALMOLOGY

## 2025-04-14 ASSESSMENT — REFRACTION_CURRENTRX
OS_SPHERE: +2.00
OS_CYLINDER: 0.00
OS_VPRISM_DIRECTION: PROGS
OD_OVR_VA: 20/
OD_ADD: +2.00
OD_ADD: +2.00
OS_OVR_VA: 20/
OD_SPHERE: +1.75
OS_SPHERE: +2.25
OS_CYLINDER: SPH
OS_OVR_VA: 20/
OD_CYLINDER: -0.50
OS_VPRISM_DIRECTION: PROGS
OS_ADD: +2.00
OD_OVR_VA: 20/
OD_CYLINDER: -0.50
OD_VPRISM_DIRECTION: PROGS
OD_AXIS: 086
OD_SPHERE: +1.75
OD_AXIS: 090
OS_AXIS: 180
OD_VPRISM_DIRECTION: PROGS
OS_ADD: +2.00

## 2025-04-14 ASSESSMENT — REFRACTION_AUTOREFRACTION
OD_CYLINDER: -0.25
OS_SPHERE: 0.00
OD_AXIS: 089
OD_SPHERE: +0.25
OS_CYLINDER: -0.25
OS_AXIS: 074

## 2025-04-14 ASSESSMENT — REFRACTION_MANIFEST
OS_SPHERE: -0.50
OD_SPHERE: +1.25
OD_SPHERE: PLANO
OS_SPHERE: +1.00
OD_ADD: +2.25
OS_AXIS: 110
OS_VA1: 20/30
OD_VA2: 20/20
OS_VA2: 20/20
OS_ADD: +2.25
OD_ADD: +2.50
OS_SPHERE: +1.25
OD_SPHERE: +1.00
OS_CYLINDER: -0.25
OS_ADD: +2.50
OD_CYLINDER: -0.50
OS_CYLINDER: SPH
OD_VA1: 20/25
OD_AXIS: 070
OD_VA1: 20/40
OS_VA1: 20/25

## 2025-04-14 ASSESSMENT — SUPERFICIAL PUNCTATE KERATITIS (SPK)
OD_SPK: 1+
OS_SPK: 1+

## 2025-04-14 ASSESSMENT — KERATOMETRY
OS_AXISANGLE_DEGREES: 085
OD_K1POWER_DIOPTERS: 45.50
OD_K2POWER_DIOPTERS: 46.00
OS_K1POWER_DIOPTERS: 45.50
OD_AXISANGLE_DEGREES: 090
OS_K2POWER_DIOPTERS: 46.00
METHOD_AUTO_MANUAL: AUTO

## 2025-04-14 ASSESSMENT — PACHYMETRY
OS_CT_UM: 534
OD_CT_UM: 522
OS_CT_CORRECTION: 1
OD_CT_CORRECTION: 1

## 2025-04-14 ASSESSMENT — VISUAL ACUITY
OD_BCVA: 20/30
OS_BCVA: 20/30

## 2025-04-14 ASSESSMENT — CONFRONTATIONAL VISUAL FIELD TEST (CVF)
OS_FINDINGS: FULL
OD_FINDINGS: FULL

## 2025-04-14 ASSESSMENT — DECREASING TEAR LAKE - SEVERITY SCORE
OD_DEC_TEARLAKE: 1+
OS_DEC_TEARLAKE: 1+

## 2025-04-14 ASSESSMENT — TONOMETRY
OD_IOP_MMHG: 14
OS_IOP_MMHG: 15